# Patient Record
Sex: FEMALE | Race: WHITE | NOT HISPANIC OR LATINO | URBAN - METROPOLITAN AREA
[De-identification: names, ages, dates, MRNs, and addresses within clinical notes are randomized per-mention and may not be internally consistent; named-entity substitution may affect disease eponyms.]

---

## 2017-03-24 NOTE — PATIENT DISCUSSION
Refractive Lens Exchange Counseling: I have discussed the options for a Refractive Lens Exchange (RLE)  surgery to decrease dependency on glasses and/or contact lenses. It was emphasized to the patient that the goal of reducing spectacle dependence not spectacle freedom is more realistic. The patient understands it is possible they will still need a weak spectacle prescription and/or a LASIK enhancement to achieve visual target.

## 2017-03-24 NOTE — PATIENT DISCUSSION
It was discussed and explained that monovision is a compromise and that vision will be limited during certain activities. Activities include: driving at night and near tasks that require good depth perception.

## 2017-03-24 NOTE — PATIENT DISCUSSION
It was discussed and explained that after having RLE surgery, corneal astigmatism may / will be induced and will have to be addressed upon post operative stabilization.

## 2017-03-24 NOTE — PATIENT DISCUSSION
It was discussed and explained that in the event topographical mapping indicates lenticular astigmatism in either eye(s) by having LASIK / PRK-ASA corneal astigmatism may  /will be induced and will have to be addressed at the time of cataract surgery when that time comes.

## 2017-03-24 NOTE — PATIENT DISCUSSION
Refractive Counseling: I have discussed the options for refractive surgery to decrease dependency on glasses and/or contact lenses. These options include: LASIK, PRK / ASA, ICL, RLE. It was emphasized to the patient that the goal of reducing spectacle dependence not spectacle freedom is more realistic. The patient understands it is possible they will still need a weak spectacle prescription and/or a LASIK enhancement to achieve visual target.

## 2017-03-24 NOTE — PATIENT DISCUSSION
The risks, benefits and alternatives of refractive surgery have been  discussed to include possible decrease in vision, dry eye, and halos/starbursts around lights

## 2017-03-24 NOTE — PATIENT DISCUSSION
It was discussed and explained that patients with large pupils are at a higher risk of experiencing glare and halo's at night after refractive surgery.

## 2017-03-24 NOTE — PATIENT DISCUSSION
The risks, benefits and alternatives of RLE surgery have been  discussed to include but are not limited to: decrease in vision, loss of eye, infection, dry eye, bleeding, retinal detachment, need for more surgery.

## 2017-04-10 NOTE — PATIENT DISCUSSION
It was discussed and explained that reading glasses will be needed for the correction of presbyopia after refractive surgery starting around the age of [de-identified].

## 2017-04-10 NOTE — PATIENT DISCUSSION
The patient was advised that if the multifocal or extended-depth-of-focus lens does not meet his/her expectations and glasses provide an increased quality of vision, glasses would be prescribed. However, if the lenses are limiting their vision or their activities, and the vision is neither correctable nor satisfactory with glasses, then an IOL exchange may be considered. It was emphasized to the patient that the goal of reducing spectacle dependence not spectacle freedom is more realistic.

## 2017-04-10 NOTE — PATIENT DISCUSSION
Cataract and Presbyopic Correction Counseling: Presbyopia is a gradual, age-related loss of the eye&rsquo;s ability to focus actively on nearby objects. The patient has expressed an interest in presbyopic correction at the time of surgery. I have discussed the options of a multifocal versus extended-depth-of-focus lens.

## 2017-04-10 NOTE — PATIENT DISCUSSION
It was explained to the patient that the vision may still be limited after RLE surgery as a result of the macular degeneration, however; it is believed that surgery may significantly improve both the visual and functional status of the patient.

## 2017-04-10 NOTE — PATIENT DISCUSSION
"It was explained that the extended-depth-of-focus lens provides an increased quality of vision and larger range of vision, to include computer vision. There is some dependency on lighting and no associated risk of ""Z"" Syndrome.  There is the possibility of having perception of glare around lights under nighttime conditions

## 2017-04-10 NOTE — PATIENT DISCUSSION
CATARACTS, OU- NOT VISUALLY SIGNIFICANT. DISC OPTION OF [id number]. GLASSES RX GIVEN TO FILL IF DESIRES.

## 2017-05-17 ENCOUNTER — IMPORTED ENCOUNTER (OUTPATIENT)
Dept: URBAN - METROPOLITAN AREA CLINIC 31 | Facility: CLINIC | Age: 77
End: 2017-05-17

## 2017-05-17 PROBLEM — H02.831: Noted: 2017-05-17

## 2017-05-17 PROBLEM — H26.493: Noted: 2017-05-17

## 2017-05-17 PROBLEM — H02.834: Noted: 2017-05-17

## 2017-05-17 PROBLEM — H43.813: Noted: 2017-05-17

## 2017-05-17 PROBLEM — Z96.1: Noted: 2017-05-17

## 2017-05-17 PROCEDURE — 92014 COMPRE OPH EXAM EST PT 1/>: CPT

## 2017-05-17 NOTE — PATIENT DISCUSSION
1.  Pseudophakia OU - IOLs stable. Monitor. 2. PCO OU: (Posterior Capsule Opacification)  Not visually significant at this time. Monitor for yag capsulotomy necessity. 3. PVD OU:  Patient was cautioned to call our office immediately if they experience a substantial change in their symptoms such as an increase in floaters persistent flashes loss of visual field (may appear as a shadow or a curtain) or decrease in visual acuity as these may indicate a retinal tear or detachment. If this is a new problem patient will need to return for re-examination  as determined by the physician. H/o high myopia risk of tear is higher. 4. Dermatochalasis OU:  Refer to Oculoplastics specialist. 5.  Return for an appointment in 12 months for comprehensive exam. with Dr. Justin Gorman.  6.  Myopic degeneration

## 2018-07-17 ENCOUNTER — COMMUNICATION - HEALTHEAST (OUTPATIENT)
Dept: SCHEDULING | Facility: CLINIC | Age: 78
End: 2018-07-17

## 2019-03-01 ENCOUNTER — IMPORTED ENCOUNTER (OUTPATIENT)
Dept: URBAN - METROPOLITAN AREA CLINIC 31 | Facility: CLINIC | Age: 79
End: 2019-03-01

## 2019-03-01 PROBLEM — H26.493: Noted: 2019-03-01

## 2019-03-01 PROBLEM — H43.813: Noted: 2019-03-01

## 2019-03-01 PROBLEM — H02.831: Noted: 2019-03-01

## 2019-03-01 PROBLEM — Z96.1: Noted: 2019-03-01

## 2019-03-01 PROBLEM — H44.23: Noted: 2019-03-01

## 2019-03-01 PROBLEM — H02.834: Noted: 2019-03-01

## 2019-03-01 PROCEDURE — 92015 DETERMINE REFRACTIVE STATE: CPT

## 2019-03-01 PROCEDURE — 92014 COMPRE OPH EXAM EST PT 1/>: CPT

## 2019-03-01 NOTE — PATIENT DISCUSSION
1.  Pseudophakia OU - IOLs stable. Monitor. 2. PCO OU: (Posterior Capsule Opacification)  Not visually significant at this time. Monitor for yag capsulotomy necessity. 3. PVD OU:  Patient was cautioned to call our office immediately if they experience a substantial change in their symptoms such as an increase in floaters persistent flashes loss of visual field (may appear as a shadow or a curtain) or decrease in visual acuity as these may indicate a retinal tear or detachment. If this is a new problem patient will need to return for re-examination  as determined by the physician. H/o high myopia risk of tear is higher. 4. Dermatochalasis OU:  Refer to Oculoplastics specialist. 5.  Myopic degenerationReturn for an appointment in 12 months for comprehensive exam. with Dr. Arline Gaspar.

## 2020-03-03 ENCOUNTER — IMPORTED ENCOUNTER (OUTPATIENT)
Dept: URBAN - METROPOLITAN AREA CLINIC 31 | Facility: CLINIC | Age: 80
End: 2020-03-03

## 2020-03-03 PROBLEM — H43.813: Noted: 2020-03-03

## 2020-03-03 PROBLEM — H02.831: Noted: 2020-03-03

## 2020-03-03 PROBLEM — H26.493: Noted: 2020-03-03

## 2020-03-03 PROBLEM — H44.23: Noted: 2020-03-03

## 2020-03-03 PROBLEM — H02.834: Noted: 2020-03-03

## 2020-03-03 PROBLEM — Z96.1: Noted: 2020-03-03

## 2020-03-03 PROCEDURE — 92014 COMPRE OPH EXAM EST PT 1/>: CPT

## 2020-03-03 NOTE — PATIENT DISCUSSION
1.  PCO OU: (Posterior Capsule Opacification)  Not visually significant at this time. Monitor for yag capsulotomy necessity. 2. Pseudophakia OU - IOLs stable. Monitor for changes in vision. 3. PVD OU:  Patient was cautioned to call our office immediately if they experience a substantial change in their symptoms such as an increase in floaters persistent flashes loss of visual field (may appear as a shadow or a curtain) or decrease in visual acuity as these may indicate a retinal tear or detachment. If this is a new problem patient will need to return for re-examination  as determined by the [address] [place] [address] 10. Dermatochalasis OU:  Patient currently asymptomatic. Observe. 5. Myopic degeneration6. Return for an appointment in 1 year for comprehensive exam. with Dr. Justin Gorman.

## 2021-05-18 ENCOUNTER — IMPORTED ENCOUNTER (OUTPATIENT)
Dept: URBAN - METROPOLITAN AREA CLINIC 31 | Facility: CLINIC | Age: 81
End: 2021-05-18

## 2021-05-18 PROBLEM — H26.493: Noted: 2021-05-18

## 2021-05-18 PROBLEM — H44.23: Noted: 2021-05-18

## 2021-05-18 PROBLEM — H43.813: Noted: 2021-05-18

## 2021-05-18 PROBLEM — Z96.1: Noted: 2021-05-18

## 2021-05-18 PROCEDURE — 92014 COMPRE OPH EXAM EST PT 1/>: CPT

## 2021-05-18 PROCEDURE — 92015 DETERMINE REFRACTIVE STATE: CPT

## 2021-05-18 NOTE — PATIENT DISCUSSION
PCO  OU (Posterior Capsule Opacification)   PCO is visually significant and impairment of vision does not meet the patient’s functional needs or interferes with activities of daily living. Risks benefits and alternatives to the Nd:YAG Laser reviewed including elevated IOP immediately postop and retinal tear/detachment. Patient to notify their ophthalmologist promptly if they have a significant change in symptoms such as flashes of light (photopsia) an increase in floaters loss of visual field or decrease in visual acuity after the procedure. Patient will be scheduled in [facility] [address] for Nd:YAG Laser. Schedule yag caps od/os

## 2021-05-19 NOTE — PATIENT DISCUSSION
It was discussed and explained that reading glasses will be needed for the correction of presbyopia after refractive surgery starting around the age of [de-identified]. H Plasty Text: Given the location of the defect, shape of the defect and the proximity to free margins a H-plasty was deemed most appropriate for repair.  Using a sterile surgical marker, the appropriate advancement arms of the H-plasty were drawn incorporating the defect and placing the expected incisions within the relaxed skin tension lines where possible. The area thus outlined was incised deep to adipose tissue with a #15 scalpel blade. The skin margins were undermined to an appropriate distance in all directions utilizing iris scissors.  The opposing advancement arms were then advanced into place in opposite direction and anchored with interrupted buried subcutaneous sutures.

## 2021-11-09 ENCOUNTER — IMPORTED ENCOUNTER (OUTPATIENT)
Dept: URBAN - METROPOLITAN AREA CLINIC 31 | Facility: CLINIC | Age: 81
End: 2021-11-09

## 2021-11-09 PROBLEM — Z96.1: Noted: 2021-11-09

## 2021-11-09 PROBLEM — H43.813: Noted: 2021-11-09

## 2021-11-09 PROBLEM — H44.23: Noted: 2021-11-09

## 2021-11-09 PROCEDURE — 99214 OFFICE O/P EST MOD 30 MIN: CPT

## 2021-11-09 PROCEDURE — 92015 DETERMINE REFRACTIVE STATE: CPT

## 2021-11-09 NOTE — PATIENT DISCUSSION
1.  Pseudophakia OU - IOLs stable. Monitor for changes in vision. Glasses for driving. 2. PVD OU:  Patient was cautioned to call our office immediately if they experience a substantial change in their symptoms such as an increase in floaters persistent flashes loss of visual field (may appear as a shadow or a curtain) or decrease in visual acuity as these may indicate a retinal tear or detachment. If this is a new problem patient will need to return for re-examination  as determined by the [address]. Myopic degeneration retina stable. 4. Return for an appointment in 12 months for comprehensive exam. with Dr. Jennifer Shah.

## 2022-04-02 ASSESSMENT — VISUAL ACUITY
OU_CC: 20/20
OS_CC: 20/20-2
OU_CC: 20/20
OD_CC: 20/20
OU_CC: 20/20
OS_CC: 20/30+2
OD_CC: J1+
OS_CC: J1+
OS_CC: 20/20-1
OS_CC: 20/20
OS_CC: J1
OS_PH: SC 20/25
OD_CC: 20/25-3
OD_CC: J1+
OS_GLARE: 20/60MED
OD_CC: 20/30-2
OS_CC: 20/30-1
OD_CC: 20/40+1
OD_CC: 20/20-2
OD_PH: CC 20/25 -2
OD_CC: 20/30+1
OS_CC: 20/20-1
OD_GLARE: 20/70MED

## 2022-04-02 ASSESSMENT — TONOMETRY
OD_IOP_MMHG: 12
OS_IOP_MMHG: 17
OS_IOP_MMHG: 14
OS_IOP_MMHG: 15
OD_IOP_MMHG: 13
OD_IOP_MMHG: 13
OD_IOP_MMHG: 12
OD_IOP_MMHG: 12
OS_IOP_MMHG: 13
OS_IOP_MMHG: 15

## 2023-06-29 ENCOUNTER — THERAPY VISIT (OUTPATIENT)
Dept: PHYSICAL THERAPY | Facility: REHABILITATION | Age: 83
End: 2023-06-29
Payer: MEDICARE

## 2023-06-29 DIAGNOSIS — M62.81 MUSCLE WEAKNESS (GENERALIZED): ICD-10-CM

## 2023-06-29 DIAGNOSIS — R26.9 ABNORMAL GAIT: Primary | ICD-10-CM

## 2023-06-29 ASSESSMENT — ACTIVITIES OF DAILY LIVING (ADL)
PLEASE_INDICATE_YOR_PRIMARY_REASON_FOR_REFERRAL_TO_THERAPY:: KNEE
PAIN: THE SYMPTOM AFFECTS MY ACTIVITY SLIGHTLY

## 2023-06-29 NOTE — PATIENT INSTRUCTIONS
Need an order for physical therapy from [place] faxed to [facility] - fax number is [phone]

## 2023-06-29 NOTE — PROGRESS NOTES
Pt presents to PT today for an evaluation for gait and muscle weakness concerns but does not have an existing physical therapy order and reports she has no [place] MD - resident of [place] and no order from her [place] MD. Pt was advised that she may be responsible for the charges from the PT session today if her [place] MD doesn't send an order or sign the PT POC and pt wishes to hold on starting PT until she gets an order from her MD.  Short subjective was taken - see below.   No charges for today's visit as pt declined having eval and treatment today.  Clementine Faulkner PT, DPT, CLT    Presenting condition or subjective complaint:     Pt reports being very active her whole life. She usually resides in [place] but is staying up here for the summer with her  in their motor home. Pt reports getting COVID maybe 3 years ago and then got the COVID 2 dose vaccine series and she feels like she is losing her ability to walk and have energy ever since. Pt reports getting the vaccines 2/12 and 3/12/21.

## 2023-06-30 ENCOUNTER — TRANSCRIBE ORDERS (OUTPATIENT)
Dept: OTHER | Age: 83
End: 2023-06-30

## 2023-06-30 DIAGNOSIS — R26.89 BALANCE PROBLEM: Primary | ICD-10-CM

## 2023-07-10 ENCOUNTER — THERAPY VISIT (OUTPATIENT)
Dept: PHYSICAL THERAPY | Facility: REHABILITATION | Age: 83
End: 2023-07-10
Payer: MEDICARE

## 2023-07-10 DIAGNOSIS — M62.81 MUSCLE WEAKNESS (GENERALIZED): ICD-10-CM

## 2023-07-10 DIAGNOSIS — R26.9 ABNORMAL GAIT: ICD-10-CM

## 2023-07-10 DIAGNOSIS — R26.89 BALANCE PROBLEMS: Primary | ICD-10-CM

## 2023-07-10 PROCEDURE — 97161 PT EVAL LOW COMPLEX 20 MIN: CPT | Mod: GP | Performed by: PHYSICAL THERAPIST

## 2023-07-10 PROCEDURE — 97112 NEUROMUSCULAR REEDUCATION: CPT | Mod: GP | Performed by: PHYSICAL THERAPIST

## 2023-07-10 PROCEDURE — 97110 THERAPEUTIC EXERCISES: CPT | Mod: GP | Performed by: PHYSICAL THERAPIST

## 2023-07-10 NOTE — PROGRESS NOTES
PHYSICAL THERAPY EVALUATION  Type of Visit: Evaluation    See electronic medical record for Abuse and Falls Screening details.    Subjective      Presenting condition or subjective complaint: bbalance issuesPt reports she usually lives in [place] in a RV park and it had a nice black top parking lot and pt used to walk 1.25 miles 1-2x/day when she was there. Pt also was attending a stretch and exercise class 2x/week.  Pt is up in [place] for the summer visiting family and she hasn't been able to practice walking as much since she has been here.   Pt reports getting COVID maybe 3 years ago  and then got the COVID 2 dose vaccine series and she feels like she is losing her ability to walk and have energy ever since. Pt reports getting the vaccines 2/12 and 3/12/21. Pt has been to an ENT but they couldn't finish the testing due to pt not tolerating the exam.      Date of onset: 05/01/23    Relevant medical history:   COVID   Dates & types of surgery: nonenone    Prior therapy history for the same diagnosis, illness or injury:  Pt reports she was having some PT performed when she lived in [place] this past winter and felt like she was making some progress but then she had to come to [place].      Prior Level of Function   Transfers: Independent  Ambulation: Independent  ADL: Independent      Living Environment  Social support: With a significant other or spouse   Type of home: Other   Stairs to enter the home: Yes 3 Is there a railing: Yes   Ramp: No   Stairs inside the home: No       Help at home:    Equipment owned: Straight Cane; Walker     Employment:    Retired  Hobbies/Interests:  Walking    Patient goals for therapy: walk normal instead of wobble    Pain assessment:  Pt denies pain.     Objective   Cognitive Status Examination  Orientation: Oriented to person, place and time   Level of Consciousness: Alert  Follows Commands and Answers Questions: 100% of the time    OBSERVATION:      POSTURE: Rounded shoulders, forward head and  excessive thoracic kyphosis    RANGE OF MOTION: WFL B UE and LE  STRENGTH: B UE grossly 4+/5 except biceps/triceps 5/5, R hip flexor 3/5, L hip flexor 3+/5, B quad and DF 4+/5   Trunk/Core weakness noted with transitions and trunk mobility      TRANSFERS: Slow and uses UE to assist with transfers    GAIT:   Gait Deviations:  Shuffled, shortened steps with lateral path deviations, B trendelenberg  Assistive Device(s): None  Stairs: Has to use railings to feel safe going into RV    BALANCE: Sit to Stand Balance:5 reps in 25 seconds  APTA sit to stand 7 reps in 30 seconds    SENSATION: WFL to light touch but feet can feel cold at night    Assessment & Plan   CLINICAL IMPRESSIONS   Medical Diagnosis: Balance Problems    Treatment Diagnosis: Balance problems with abnormal gait and weakness   Impression/Assessment: Patient is a 83 year old female with balance, weakness and walking difficulty complaints.  Pt reports her sx with decreased energy and problems with gait and balance seemed to start bothering her after she had COVID 3 years ago and then even more so after she received the 2 dose COVID vaccines in Feb and March of 2021. Pt usually lives in [place] and had started to do some PT this winter there but now is here for the summer and looking for help. The following significant findings have been identified: Decreased ROM/flexibility, Decreased joint mobility, Decreased strength, Impaired balance, Decreased proprioception, Impaired gait, Impaired muscle performance, Decreased activity tolerance and Impaired posture. These impairments interfere with their ability to perform self care tasks, recreational activities, household chores, household mobility and community mobility as compared to previous level of function.     Clinical Decision Making (Complexity):   Clinical Presentation: Stable/Uncomplicated  Clinical Presentation Rationale: based on medical and personal factors listed in PT evaluation  Clinical Decision Making  (Complexity): Low complexity    PLAN OF CARE  Treatment Interventions:  Interventions: Gait Training, Manual Therapy, Neuromuscular Re-education, Therapeutic Activity, Therapeutic Exercise, Self-Care/Home Management    Long Term Goals     PT Goal 1  Goal Description: Pt will be able to walk 1 mile with subjective rating of good balance 90% of the time for improvement to walking ability and safety in 90 days.  Rationale: to maximize safety and independence within the community  Target Date: 10/07/23  PT Goal 2  Goal Description: Pt will be able to perform 13 reps sit to  30 seconds to decrease her risk of falls in 90 days.  Rationale: to maximize safety and independence with performance of ADLs and functional tasks  Target Date: 10/07/23      Frequency of Treatment: 1x/week  Duration of Treatment: 90 days    Education Assessment:        Risks and benefits of evaluation/treatment have been explained.   Patient/Family/caregiver agrees with Plan of Care.     Evaluation Time:     PT Eval, Low Complexity Minutes (26547): 24    Signing Clinician: Clementine Faulkner PT, DPT, CLT      [facility]                                                                                   OUTPATIENT PHYSICAL THERAPY      PLAN OF TREATMENT FOR OUTPATIENT REHABILITATION   Patient's Last Name, First Name, ASIYAGRICEL  Onelia Epstein YOB: 1940   Provider's Name   [facility]   Medical Record No.  [id number]     Onset Date: 05/01/23  Start of Care Date: 07/10/23     Medical Diagnosis:  Balance Problems      PT Treatment Diagnosis:  Balance problems with abnormal gait and weakness Plan of Treatment  Frequency/Duration: 1x/week/ 90 days    Certification date from 07/10/23 to 10/07/23         See note for plan of treatment details and functional goals     Clementine Faulkner PT, DPT, CLT                         I CERTIFY THE NEED FOR THESE SERVICES FURNISHED UNDER         THIS PLAN OF TREATMENT AND WHILE UNDER MY CARE .             Physician Signature               Date    X_____________________________________________________                        Referring Provider:  Provider Not In System - Dr. Nicholas Leonard, [place]      Initial Assessment  See Epic Evaluation- Start of Care Date: 07/10/23

## 2023-07-10 NOTE — PATIENT INSTRUCTIONS
Sit to stand exercise - DO THIS FROM an elevated surface so not as stressful on your knee    X10-20 reps 1-2x/day    Try to practice walking a couple of blocks every day - keep track of how far or how many minutes you are walking for

## 2023-07-31 ENCOUNTER — THERAPY VISIT (OUTPATIENT)
Dept: PHYSICAL THERAPY | Facility: REHABILITATION | Age: 83
End: 2023-07-31
Payer: MEDICARE

## 2023-07-31 DIAGNOSIS — M62.81 MUSCLE WEAKNESS (GENERALIZED): ICD-10-CM

## 2023-07-31 DIAGNOSIS — R26.89 BALANCE PROBLEMS: Primary | ICD-10-CM

## 2023-07-31 DIAGNOSIS — R26.9 ABNORMAL GAIT: ICD-10-CM

## 2023-07-31 PROCEDURE — 97110 THERAPEUTIC EXERCISES: CPT | Mod: GP | Performed by: PHYSICAL THERAPIST

## 2023-07-31 NOTE — PATIENT INSTRUCTIONS
Modifications to your exercise routine      X10-15 reps each side 1-2x/day KEEP YOUR KNEE STRAIGHT - check each rep with leg on floor if your knee is straight on the ground - squeezing your thigh to keep it straight - and keep it slow      X10-15 reps each side 1-2x/day KEEP YOUR KNEE STRAIGHT and TOP LEG ON TOP OF BOTTOM FOOT      SLOW SLOW SLOW x10-15 reps on each side 1-2x/day    KEEP working on your sit to stand/squat exercises - try to do 13 reps in a row!

## 2023-11-14 ENCOUNTER — COMPREHENSIVE EXAM (OUTPATIENT)
Dept: URBAN - METROPOLITAN AREA CLINIC 29 | Facility: CLINIC | Age: 83
End: 2023-11-14

## 2023-11-14 DIAGNOSIS — Z96.1: ICD-10-CM

## 2023-11-14 DIAGNOSIS — H43.813: ICD-10-CM

## 2023-11-14 DIAGNOSIS — H44.23: ICD-10-CM

## 2023-11-14 PROCEDURE — 92014 COMPRE OPH EXAM EST PT 1/>: CPT

## 2023-11-14 ASSESSMENT — VISUAL ACUITY
OD_SC: 20/30
OS_SC: 20/25